# Patient Record
Sex: MALE | Race: ASIAN | NOT HISPANIC OR LATINO | Employment: UNEMPLOYED | URBAN - METROPOLITAN AREA
[De-identification: names, ages, dates, MRNs, and addresses within clinical notes are randomized per-mention and may not be internally consistent; named-entity substitution may affect disease eponyms.]

---

## 2018-06-11 ENCOUNTER — COMMUNICATION - HEALTHEAST (OUTPATIENT)
Dept: UROLOGY | Facility: CLINIC | Age: 53
End: 2018-06-11

## 2018-06-12 ENCOUNTER — OFFICE VISIT - HEALTHEAST (OUTPATIENT)
Dept: CARDIOLOGY | Facility: CLINIC | Age: 53
End: 2018-06-12

## 2018-06-12 DIAGNOSIS — R94.31 ABNORMAL ECG: ICD-10-CM

## 2018-06-12 ASSESSMENT — MIFFLIN-ST. JEOR: SCORE: 1450.42

## 2018-06-18 ENCOUNTER — COMMUNICATION - HEALTHEAST (OUTPATIENT)
Dept: UROLOGY | Facility: CLINIC | Age: 53
End: 2018-06-18

## 2021-06-01 VITALS — WEIGHT: 161 LBS | HEIGHT: 63 IN | BODY MASS INDEX: 28.53 KG/M2

## 2021-06-18 NOTE — PROGRESS NOTES
"[facility] Consultation Note    Thank you, for asking the [facility] team to see Dora Velarde in consultation today at our clinic to evaluate abnormal ECG.      Assessment:   1.  Abnormal ECG with early repolarization abnormality is a nonspecific finding  2.  No risk factors for premature coronary artery disease     Plan:   No further cardiac evaluation or follow-up is needed at this time            Current History:   52-year-old male with no previous cardiac history.  The patient went to the emergency room on  for left flank pain.  He was noted to have hematuria on urinalysis and a obstructing left ureteral kidney stone on abdominal CT scan.  ECG was obtained that showed early repolarization change on 2 separate tracing.  Patient denies any exertional chest pain, dyspnea on exertion or change in exercise tolerance    Past Medical History:   No past medical history on file.  Patient has no history of hypertension, hyperlipidemia or type 2 diabetes mellitus    Past Surgical History:   No past surgical history on file.  Surgical repair of right thumb injury    Family History:   No family history on file.  Both parents  in their 80s.  Patient has 5 brothers none of whom have known coronary artery disease    Social History:    reports that he has never smoked. He has never used smokeless tobacco.  Patient is  lives with his wife    Meds:   Scheduled Meds:  PRN Meds:.    Allergies:   Review of patient's allergies indicates no known allergies.    Review of Systems:   General: WNL  Eyes: WNL  Ears/Nose/Throat: WNL  Lungs: WNL  Heart: WNL  Stomach: WNL  Bladder: WNL  Muscle/Joints: WNL  Skin: WNL  Nervous System: WNL  Mental Health: WNL     Blood: WNL      Objective:      Physical Exam  161 lb (73 kg)  5' 3\" (1.6 m)  Body mass index is 28.52 kg/(m^2).  /78 (Patient Site: Right Arm, Patient Position: Sitting, Cuff Size: Adult Regular)  Pulse 92  Resp 16  Ht 5' 3\" " (1.6 m)  Wt 161 lb (73 kg)  BMI 28.52 kg/m2    General Appearance:   alert, no apparent distress   HEENT:  no scleral icterus; the mucous membranes were pink and moist                                  Neck: jugular venous pressure is normal, no thyromegaly   Chest: the spine was straight and the chest was symmetric   Lungs:   respirations unlabored; the lungs are clear to auscultation   Cardiovascular:   regular rhythm with normal first and second heart sounds and no murmurs, clicks, or gallops. Carotid, radial, femoral, and posterior tibial pulses are intact; there are no carotid or femoral bruits.   Abdomen:  no organomegaly, masses, bruits, or tenderness; bowel sounds are present   Extremities: no cyanosis, clubbing, or edema.  No obvious musculoskeletal abnormalities   Skin: no xanthelasma   Neurologic: mood and affect are appropriate         ECGs from June 8, 2018 were both personally reviewed          Lab Review   Lab Results   Component Value Date     06/08/2018    K 3.8 06/08/2018     (H) 06/08/2018    CO2 25 06/08/2018    BUN 15 06/08/2018    CREATININE 0.81 06/08/2018    CALCIUM 7.8 (L) 06/08/2018     Lab Results   Component Value Date    WBC 11.3 (H) 06/08/2018    HGB 15.2 06/08/2018    HCT 43.4 06/08/2018    MCV 86 06/08/2018     06/08/2018     No results found for: CHOL, TRIG, HDL, LDLDIRECT      Avi Beltran M.D., F.A.C.C.  [facility]  [phone]

## 2024-06-05 ENCOUNTER — HOSPITAL ENCOUNTER (EMERGENCY)
Facility: CLINIC | Age: 59
Discharge: HOME | End: 2024-06-05
Attending: EMERGENCY MEDICINE | Admitting: EMERGENCY MEDICINE
Payer: COMMERCIAL

## 2024-06-05 VITALS
WEIGHT: 180 LBS | RESPIRATION RATE: 19 BRPM | HEIGHT: 63 IN | OXYGEN SATURATION: 97 % | DIASTOLIC BLOOD PRESSURE: 86 MMHG | HEART RATE: 68 BPM | TEMPERATURE: 97.7 F | BODY MASS INDEX: 31.89 KG/M2 | SYSTOLIC BLOOD PRESSURE: 127 MMHG

## 2024-06-05 DIAGNOSIS — R42 VERTIGO: ICD-10-CM

## 2024-06-05 LAB
ANION GAP SERPL CALCULATED.3IONS-SCNC: 9 MMOL/L (ref 7–15)
ATRIAL RATE - MUSE: 82 BPM
BASOPHILS # BLD AUTO: 0.1 10E3/UL (ref 0–0.2)
BASOPHILS NFR BLD AUTO: 1 %
BUN SERPL-MCNC: 19.8 MG/DL (ref 8–23)
CALCIUM SERPL-MCNC: 9.3 MG/DL (ref 8.8–10.2)
CHLORIDE SERPL-SCNC: 109 MMOL/L (ref 98–107)
CREAT SERPL-MCNC: 1.02 MG/DL (ref 0.67–1.17)
DEPRECATED HCO3 PLAS-SCNC: 23 MMOL/L (ref 22–29)
DIASTOLIC BLOOD PRESSURE - MUSE: NORMAL MMHG
EGFRCR SERPLBLD CKD-EPI 2021: 84 ML/MIN/1.73M2
EOSINOPHIL # BLD AUTO: 0.2 10E3/UL (ref 0–0.7)
EOSINOPHIL NFR BLD AUTO: 2 %
ERYTHROCYTE [DISTWIDTH] IN BLOOD BY AUTOMATED COUNT: 12.9 % (ref 10–15)
GLUCOSE SERPL-MCNC: 128 MG/DL (ref 70–99)
HCT VFR BLD AUTO: 44.4 % (ref 40–53)
HGB BLD-MCNC: 15.3 G/DL (ref 13.3–17.7)
IMM GRANULOCYTES # BLD: 0 10E3/UL
IMM GRANULOCYTES NFR BLD: 0 %
INTERPRETATION ECG - MUSE: NORMAL
LYMPHOCYTES # BLD AUTO: 2.5 10E3/UL (ref 0.8–5.3)
LYMPHOCYTES NFR BLD AUTO: 34 %
MCH RBC QN AUTO: 29.9 PG (ref 26.5–33)
MCHC RBC AUTO-ENTMCNC: 34.5 G/DL (ref 31.5–36.5)
MCV RBC AUTO: 87 FL (ref 78–100)
MONOCYTES # BLD AUTO: 0.5 10E3/UL (ref 0–1.3)
MONOCYTES NFR BLD AUTO: 7 %
NEUTROPHILS # BLD AUTO: 4.2 10E3/UL (ref 1.6–8.3)
NEUTROPHILS NFR BLD AUTO: 56 %
NRBC # BLD AUTO: 0 10E3/UL
NRBC BLD AUTO-RTO: 0 /100
P AXIS - MUSE: 42 DEGREES
PLATELET # BLD AUTO: 197 10E3/UL (ref 150–450)
POTASSIUM SERPL-SCNC: 4 MMOL/L (ref 3.4–5.3)
PR INTERVAL - MUSE: 156 MS
QRS DURATION - MUSE: 80 MS
QT - MUSE: 364 MS
QTC - MUSE: 425 MS
R AXIS - MUSE: -15 DEGREES
RBC # BLD AUTO: 5.12 10E6/UL (ref 4.4–5.9)
SODIUM SERPL-SCNC: 141 MMOL/L (ref 135–145)
SYSTOLIC BLOOD PRESSURE - MUSE: NORMAL MMHG
T AXIS - MUSE: 4 DEGREES
VENTRICULAR RATE- MUSE: 82 BPM
WBC # BLD AUTO: 7.5 10E3/UL (ref 4–11)

## 2024-06-05 PROCEDURE — 36415 COLL VENOUS BLD VENIPUNCTURE: CPT | Performed by: EMERGENCY MEDICINE

## 2024-06-05 PROCEDURE — 82374 ASSAY BLOOD CARBON DIOXIDE: CPT | Performed by: EMERGENCY MEDICINE

## 2024-06-05 PROCEDURE — 99284 EMERGENCY DEPT VISIT MOD MDM: CPT | Mod: 25

## 2024-06-05 PROCEDURE — 93005 ELECTROCARDIOGRAM TRACING: CPT | Performed by: EMERGENCY MEDICINE

## 2024-06-05 PROCEDURE — 85025 COMPLETE CBC W/AUTO DIFF WBC: CPT | Performed by: EMERGENCY MEDICINE

## 2024-06-05 PROCEDURE — 250N000013 HC RX MED GY IP 250 OP 250 PS 637: Performed by: EMERGENCY MEDICINE

## 2024-06-05 RX ORDER — MECLIZINE HYDROCHLORIDE 25 MG/1
25 TABLET ORAL ONCE
Status: COMPLETED | OUTPATIENT
Start: 2024-06-05 | End: 2024-06-05

## 2024-06-05 RX ORDER — MECLIZINE HCL 12.5 MG 12.5 MG/1
12.5 TABLET ORAL 4 TIMES DAILY PRN
Qty: 20 TABLET | Refills: 0 | Status: SHIPPED | OUTPATIENT
Start: 2024-06-05

## 2024-06-05 RX ADMIN — MECLIZINE HYDROCHLORIDE 25 MG: 25 TABLET ORAL at 11:43

## 2024-06-05 ASSESSMENT — COLUMBIA-SUICIDE SEVERITY RATING SCALE - C-SSRS
6. HAVE YOU EVER DONE ANYTHING, STARTED TO DO ANYTHING, OR PREPARED TO DO ANYTHING TO END YOUR LIFE?: NO
1. IN THE PAST MONTH, HAVE YOU WISHED YOU WERE DEAD OR WISHED YOU COULD GO TO SLEEP AND NOT WAKE UP?: NO
2. HAVE YOU ACTUALLY HAD ANY THOUGHTS OF KILLING YOURSELF IN THE PAST MONTH?: NO

## 2024-06-05 ASSESSMENT — ACTIVITIES OF DAILY LIVING (ADL)
ADLS_ACUITY_SCORE: 33
ADLS_ACUITY_SCORE: 33

## 2024-06-05 NOTE — ED PROVIDER NOTES
"Emergency Department Midlevel Supervisory Note     I had a face to face encounter with this patient seen by the Advanced Practice Provider (LEONID). I personally made/approved the management plan and take responsibility for the patient management. I personally saw patient and performed a substantive portion of the visit including all aspects of the medical decision making.     ED Course:  12:17 PM Sharon Lerma PA-C staffed patient with me. I agree with their assessment and plan of management, and I will see the patient.  12:31 PM I met with the patient to introduce myself, gather additional history, perform my initial exam, and discuss the plan.    Brief HPI:     Dora Velarde is a 58 year old male who presents for evaluation of vertigo and cough.    I, Ariadna Carrillo, am serving as a scribe to document services personally performed by Andriy Khalil MD, based on my observations and the provider's statements to me.   I, Andriy Khalil MD attest that Ariadna Carrillo was acting in a scribe capacity, has observed my performance of the services and has documented them in accordance with my direction.    Brief Physical Exam: /86   Pulse 68   Temp 97.7  F (36.5  C)   Resp 19   Ht 1.6 m (5' 3\")   Wt 81.6 kg (180 lb)   SpO2 97%   BMI 31.89 kg/m    Constitutional:  Alert, in no acute distress  EYES: Conjunctivae clear  HENT:  Atraumatic  Respiratory:  Respirations even, unlabored, in no acute respiratory distress  Cardiovascular:  Regular rate and rhythm, good peripheral perfusion  GI: Soft, non-distended, non-tender  Musculoskeletal:  Moves all 4 extremities equally, grossly symmetrical strength  Integument: Warm & dry. No appreciable rash, erythema.  Neurologic: Awake, alert, oriented.  Face symmetric.  Speech is normal.  Cranial nerves II through XII intact.  Strength is 5 out of 5 throughout bilateral upper and lower extremities.  Sensation light touch intact x 4.  Ambulatory with steady gait.  Finger-nose-finger " testing normal.  Psych: Normal mood and affect       MDM:  Patient is a 58-year-old male who presents the emergency department with vertigo.  He is vitally stable and nontoxic-appearing when he arrives to the emergency department.  His neurological exam is normal including coordination testing.  He is ambulatory.  He does report he has a history of vertigo which affects him about once a month.  Today's episode sounds very positional, is now resolved.  He was more concerned about today's episode it sounds like because his brother recently suffered a stroke and vertigo was one of the presenting complaints.  I think given the chronicity of these intermittent symptoms, highly positional nature, current normal exam, there would be very low suspicion for an intracranial process like stroke to account for today's presentation.  His EKG shows sinus rhythm with no acute ischemic changes.  Basic lab evaluations here are all stable and reassuring.  Patient currently has no symptoms when I am reevaluating him.  We did discuss obtaining further neuroimaging like an MRI however I think that would be exceedingly unlikely to show a central cause of vertigo and we ultimately decided to hold off on this.  Will continue with supportive treatment plan at home. Continued clinic follow-up advised.  Agree with remainder of ED course and plan as documented by LEONID.      1. Vertigo        Labs and Imaging:  Results for orders placed or performed during the hospital encounter of 06/05/24   Basic metabolic panel   Result Value Ref Range    Sodium 141 135 - 145 mmol/L    Potassium 4.0 3.4 - 5.3 mmol/L    Chloride 109 (H) 98 - 107 mmol/L    Carbon Dioxide (CO2) 23 22 - 29 mmol/L    Anion Gap 9 7 - 15 mmol/L    Urea Nitrogen 19.8 8.0 - 23.0 mg/dL    Creatinine 1.02 0.67 - 1.17 mg/dL    GFR Estimate 84 >60 mL/min/1.73m2    Calcium 9.3 8.8 - 10.2 mg/dL    Glucose 128 (H) 70 - 99 mg/dL   CBC with platelets and differential   Result Value Ref Range     WBC Count 7.5 4.0 - 11.0 10e3/uL    RBC Count 5.12 4.40 - 5.90 10e6/uL    Hemoglobin 15.3 13.3 - 17.7 g/dL    Hematocrit 44.4 40.0 - 53.0 %    MCV 87 78 - 100 fL    MCH 29.9 26.5 - 33.0 pg    MCHC 34.5 31.5 - 36.5 g/dL    RDW 12.9 10.0 - 15.0 %    Platelet Count 197 150 - 450 10e3/uL    % Neutrophils 56 %    % Lymphocytes 34 %    % Monocytes 7 %    % Eosinophils 2 %    % Basophils 1 %    % Immature Granulocytes 0 %    NRBCs per 100 WBC 0 <1 /100    Absolute Neutrophils 4.2 1.6 - 8.3 10e3/uL    Absolute Lymphocytes 2.5 0.8 - 5.3 10e3/uL    Absolute Monocytes 0.5 0.0 - 1.3 10e3/uL    Absolute Eosinophils 0.2 0.0 - 0.7 10e3/uL    Absolute Basophils 0.1 0.0 - 0.2 10e3/uL    Absolute Immature Granulocytes 0.0 <=0.4 10e3/uL    Absolute NRBCs 0.0 10e3/uL   ECG 12-LEAD WITH MUSE (LHE)   Result Value Ref Range    Systolic Blood Pressure  mmHg    Diastolic Blood Pressure  mmHg    Ventricular Rate 82 BPM    Atrial Rate 82 BPM    NE Interval 156 ms    QRS Duration 80 ms     ms    QTc 425 ms    P Axis 42 degrees    R AXIS -15 degrees    T Axis 4 degrees    Interpretation ECG       Sinus rhythm  Inferior infarct (cited on or before 08-JUN-2018)  Abnormal ECG  When compared with ECG of 08-JUN-2018 16:41,  No significant change was found  Confirmed by SEE ED PROVIDER NOTE FOR, ECG INTERPRETATION ([id number]),  BRANDEN JENKINS ([id number]) on 6/5/2024 12:19:50 PM         Procedures:  I was present for the key portions of procedures documented in LEONID/midlevel note, see midlevel note for further details.    Andriy Khalil MD  [facility] EMERGENCY ROOM  [address] [facility] [address] [phone]     Andriy Khalil MD  06/05/24 0451

## 2024-06-05 NOTE — DISCHARGE INSTRUCTIONS
You were seen in the emergency department for your dizziness today. Please take meclizine as needed for vertigo. Please follow up with your primary care clinician within the next 48-72 hours for recheck.     Call 911 anytime you think you may need emergency care. For example, call if:    You passed out (lost consciousness).     You have sudden dizziness that doesn't get better.     You have dizziness along with symptoms of a heart attack. These may include:  Chest pain or pressure, or a strange feeling in the chest.  Sweating.  Shortness of breath.  Nausea or vomiting.  Pain, pressure, or a strange feeling in the back, neck, jaw, or upper belly or in one or both shoulders or arms.  Lightheadedness or sudden weakness.  A fast or irregular heartbeat.     You have symptoms of a stroke. These may include:  Sudden numbness, tingling, weakness, or loss of movement in your face, arm, or leg, especially on only one side of your body.  Sudden vision changes.  Sudden trouble speaking.  Sudden confusion or trouble understanding simple statements.  Sudden problems with walking or balance.  A sudden, severe headache that is different from past headaches.   Call your doctor now or seek immediate medical care if:    Vertigo occurs with a fever, a headache, or ringing in your ears.     You have new or increased nausea and vomiting.     Your vertigo gets worse or happens more often.   Watch closely for changes in your health, and be sure to contact your doctor if:    You do not get better as expected.     Please return to the emergency department if you develop any new or worsening symptoms.

## 2024-06-05 NOTE — ED TRIAGE NOTES
Pt arrives with vertigo after bending over to  a grandchild. Has pretty much resolved at this time. Also reports that he has had a cough since march and if he has a coughing fit he will also become dizzy. States he feels off balance. Denies pain. Does not get worse with movement.      Triage Assessment (Adult)       Row Name 06/05/24 1128          Triage Assessment    Airway WDL WDL        Respiratory WDL    Respiratory WDL WDL        Skin Circulation/Temperature WDL    Skin Circulation/Temperature WDL WDL        Cardiac WDL    Cardiac WDL WDL        Peripheral/Neurovascular WDL    Peripheral Neurovascular WDL WDL        Cognitive/Neuro/Behavioral WDL    Cognitive/Neuro/Behavioral WDL WDL

## 2024-06-05 NOTE — Clinical Note
Dora Velarde was seen and treated in our emergency department on 6/5/2024.         Sincerely,     [facility] Emergency Room

## 2024-06-05 NOTE — ED PROVIDER NOTES
EMERGENCY DEPARTMENT ENCOUNTER      NAME: Dora Velarde  AGE: 58 year old male  YOB: 1965  MRN: [id number]  EVALUATION DATE & TIME: 06/05/24 11:40 AM    PCP: [facility]    ED PROVIDER: Sharon Lerma PA-C      CHIEF COMPLAINT:  Dizziness      FINAL IMPRESSION:  1. Vertigo          ED COURSE & MEDICAL DECISION MAKING:  Pertinent Labs & Imaging studies reviewed. (See chart for details)    The patient is a 58 year old-year-old male with a history of hypertension presenting to the emergency department for evaluation of dizziness onset at 10 AM this morning when he bent down to  his grandchild stood back up with onset of symptoms.  Associated larger comfortable, no fevers no chills, headache, vision changes.  His symptoms lasted for 1 minutes and then resolved.  He has been having intermittent episodes brought on by positional head changes for the past month.    Initial vital signs reviewed and significant for elevated blood pressure, otherwise within normal limits.  Patient is afebrile.  Repeat blood pressure within normal limits.  On exam, patient is clinically well-appearing and nontoxic-appearing in no acute distress.  Neuro examination with no focal deficits.  Patient's symptoms are reproducible with horizontal movement of his head.    I considered a broad differential diagnosis including, but not limited to BPPV, vestibular neuritis, labyrinthitis, migraine, arrhythmia, orthostatic hypotension. Meniere's and labyrinthitis less likely without hearing loss/tinnitus. No focal neuro symptoms to suggest acute CVA or TIA. Carotid or vertebral artery dissection, vertebro-basilar insufficiency also less likely given history and exam. Exam today more consistent with peripheral etiology of vertigo than central given sudden, severe onset of intermittent symptoms, with sensation worsened by position.      ECG reassuring with sinus rhythm.  CBC with no leukocytosis, hemoglobin  stable at 15.3.  BMP without significant electrolyte abnormality.  Patient reassessed after meclizine, reports symptoms of resolved.  Overall, patient history, exam, workup most consistent with vertigo, likely peripheral.  I do not feel that imaging of his head indicated on an emergent basis at this time.      Overall, patient history, exam, work appear most consistent with vertigo.  Discussed plan for discharge home with close outpatient follow-up with primary care clinician as well as prescription for meclizine to use as needed for symptoms. The patient verbalized understanding and is comfortable with this plan. Symptomatic home cares discussed. Emphasized importance of follow up with primary care clinician. Strict return precautions discussed.     At the conclusion of the encounter I discussed the results of all of the tests and the disposition. The questions were answered. The patient or family acknowledged understanding and was agreeable with the care plan.       ED COURSE:  ED Course as of 06/19/24 0005 Wed Jun 05, 2024   1141 I met and introduced myself to the patient. I gathered initial history and performed an initial physical exam. We discussed options and plan for diagnostics and treatment here in the ED.   1220 I have staffed the patient with Dr. Indra Khalil, ED physician, who has evaluated the patient and agrees with all aspects of today's care.       I discussed the plan for discharge with the patient, and patient is agreeable. We discussed supportive cares at home and reasons for return to the ER including new or worsening symptoms - all questions and concerns addressed to the best of my ability. Strict return precautions discussed. Patient to be discharged by RN.      Medical Decision Making  Obtained supplemental history:Supplemental history obtained?: Documented in chart and Family Member/Significant Other  Reviewed external records: External records reviewed?: No  Care impacted by chronic  illness:Hypertension  Care significantly affected by social determinants of health:N/A  Did you consider but not order tests?: Work up considered but not performed and documented in chart, if applicable  Did you interpret images independently?: Independent interpretation of ECG and images noted in documentation, when applicable.  Consultation discussion with other provider:Did you involve another provider (consultant, , pharmacy, etc.)?: No  Discharge. I prescribed additional prescription strength medication(s) as charted. I considered admission, but discharged patient after significant clinical improvement.      CRITICAL CARE:  Not applicable      MEDICATIONS GIVEN IN THE EMERGENCY:  Medications   meclizine (ANTIVERT) tablet 25 mg (25 mg Oral $Given 6/5/24 6380)       NEW PRESCRIPTIONS STARTED AT TODAY'S ER VISIT  Current Discharge Medication List        START taking these medications    Details   meclizine (ANTIVERT) 12.5 MG tablet Take 1 tablet (12.5 mg) by mouth 4 times daily as needed for dizziness  Qty: 20 tablet, Refills: 0                =================================================================    HPI    Patient information was obtained from: the patient     Use of Intrepreter: N/A        Dora Velarde is a 58 year old male with pertinent medical history of hypertension who presents to the emergency department for evaluation of dizziness.    The patient reports that at 10 AM this morning, he bent over to  his grandchild and developed spinning dizziness with associated nausea, no other symptoms.  His symptoms lasted for 1 minute and then resolved and he has not experienced any additional symptoms since then.  He has had intermittent episodes like these were they are brought on by positional movements for the past month.  No recent changes in blood pressure medications.  No falls, loss of consciousness, head impact, fevers, chills, chest pain, shortness of breath, headache, vision changes,  "vomiting, diarrhea, hematochezia, melena, hematemesis, or preceding trauma or injury.    PAST MEDICAL HISTORY:  No past medical history on file.    PAST SURGICAL HISTORY:  No past surgical history on file.    CURRENT MEDICATIONS:    None       ALLERGIES:  No Known Allergies    FAMILY HISTORY:  No family history on file.    SOCIAL HISTORY:  Social History     Tobacco Use    Smoking status: Never    Smokeless tobacco: Never        VITALS:    First Vitals:  Patient Vitals for the past 24 hrs:   BP Temp Pulse Resp SpO2 Height Weight   06/05/24 1131 -- 97.7  F (36.5  C) -- -- -- -- --   06/05/24 1128 (!) 152/86 -- 85 19 96 % 1.6 m (5' 3\") 81.6 kg (180 lb)       Patient Vitals for the past 24 hrs:   BP Temp Pulse Resp SpO2 Height Weight   06/05/24 1131 -- 97.7  F (36.5  C) -- -- -- -- --   06/05/24 1128 (!) 152/86 -- 85 19 96 % 1.6 m (5' 3\") 81.6 kg (180 lb)         PHYSICAL EXAM  VITAL SIGNS: BP (!) 152/86   Pulse 85   Temp 97.7  F (36.5  C)   Resp 19   Ht 1.6 m (5' 3\")   Wt 81.6 kg (180 lb)   SpO2 96%   BMI 31.89 kg/m     Constitutional: Awake, alert, No acute distress.    HENT: Normocephalic, atraumatic. No tenderness to palpation overlying temporal region. Posterior pharynx within normal limits, uvula midline, mucous membranes moist. No nuchal rigidity. Full ROM of the neck.  Eyes: Conjunctiva normal. Visual fields full to confrontation.  Pulmonary: Breathing easily, clear and equal breath sounds.  No respiratory distress.  Cardiovascular:  Regular rate and rhythm, radial and posterior tibialis pulses present, symmetric, and within normal limits.   GI: Soft, nondistended, nontender, no rebound or guarding. Bowel sounds normal.  Extremities:  Moving all extremities spontaneously. No gross deformity. Extremities are warm and well perfused.  No peripheral edema.   Integument: Exposed areas of skin warm, dry, no rashes.  Neurologic: Alert & oriented to person, place and time.  GCS 15. Patient articulates well with " no dysarthria. Upper and lower extremity strength 5/5 and symmetric. Distal sensation grossly intact in upper and lower extremities. No pronator drift. Finger-nose-finger, Straight leg raise, and heel to shin intact. No tremor. CN II-XII intact,  Psychiatric: Affect normal, Judgment normal, Mood normal. No obvious hallucinations at this time.          RADIOLOGY/LAB:  Reviewed all pertinent imaging. Please see official radiology report.  All pertinent labs reviewed and interpreted.  Results for orders placed or performed during the hospital encounter of 06/05/24   Basic metabolic panel   Result Value Ref Range    Sodium 141 135 - 145 mmol/L    Potassium 4.0 3.4 - 5.3 mmol/L    Chloride 109 (H) 98 - 107 mmol/L    Carbon Dioxide (CO2) 23 22 - 29 mmol/L    Anion Gap 9 7 - 15 mmol/L    Urea Nitrogen 19.8 8.0 - 23.0 mg/dL    Creatinine 1.02 0.67 - 1.17 mg/dL    GFR Estimate 84 >60 mL/min/1.73m2    Calcium 9.3 8.8 - 10.2 mg/dL    Glucose 128 (H) 70 - 99 mg/dL   CBC with platelets and differential   Result Value Ref Range    WBC Count 7.5 4.0 - 11.0 10e3/uL    RBC Count 5.12 4.40 - 5.90 10e6/uL    Hemoglobin 15.3 13.3 - 17.7 g/dL    Hematocrit 44.4 40.0 - 53.0 %    MCV 87 78 - 100 fL    MCH 29.9 26.5 - 33.0 pg    MCHC 34.5 31.5 - 36.5 g/dL    RDW 12.9 10.0 - 15.0 %    Platelet Count 197 150 - 450 10e3/uL    % Neutrophils 56 %    % Lymphocytes 34 %    % Monocytes 7 %    % Eosinophils 2 %    % Basophils 1 %    % Immature Granulocytes 0 %    NRBCs per 100 WBC 0 <1 /100    Absolute Neutrophils 4.2 1.6 - 8.3 10e3/uL    Absolute Lymphocytes 2.5 0.8 - 5.3 10e3/uL    Absolute Monocytes 0.5 0.0 - 1.3 10e3/uL    Absolute Eosinophils 0.2 0.0 - 0.7 10e3/uL    Absolute Basophils 0.1 0.0 - 0.2 10e3/uL    Absolute Immature Granulocytes 0.0 <=0.4 10e3/uL    Absolute NRBCs 0.0 10e3/uL   ECG 12-LEAD WITH MUSE (LHE)   Result Value Ref Range    Systolic Blood Pressure  mmHg    Diastolic Blood Pressure  mmHg    Ventricular Rate 82 BPM    Atrial  Rate 82 BPM    WA Interval 156 ms    QRS Duration 80 ms     ms    QTc 425 ms    P Axis 42 degrees    R AXIS -15 degrees    T Axis 4 degrees    Interpretation ECG       Sinus rhythm  Inferior infarct (cited on or before 08-JUN-2018)  Abnormal ECG  When compared with ECG of 08-JUN-2018 16:41,  No significant change was found  Confirmed by SEE ED PROVIDER NOTE FOR, ECG INTERPRETATION ([id number]),  BRANDEN JENKINS ([id number]) on 6/5/2024 12:19:50 PM           EKG:  Performed at: 1219 Impression: Sinus rhythm Rate: 82 Rhythm: Sinus rhythm Axis: 42  -15  4 WA Interval: 156 QRS Interval: 80 QTc Interval: 425 ST Changes: No STEMI Comparison: When compared with ECG of 08-JUN-2018 16:41,No significant change was found EKG results independently reviewed and interpreted by Dr. Indra Khalil, ED physician.               Sharon Lerma PA-C  Emergency Medicine  [facility] EMERGENCY ROOM  [facility] [facility] [id number] [phone]  Dept: [phone]     Sharon Lerma PA-C  06/19/24 0016